# Patient Record
Sex: FEMALE | Race: WHITE | NOT HISPANIC OR LATINO | Employment: OTHER | URBAN - METROPOLITAN AREA
[De-identification: names, ages, dates, MRNs, and addresses within clinical notes are randomized per-mention and may not be internally consistent; named-entity substitution may affect disease eponyms.]

---

## 2022-05-10 ENCOUNTER — EVALUATION (OUTPATIENT)
Dept: PHYSICAL THERAPY | Facility: MEDICAL CENTER | Age: 67
End: 2022-05-10
Payer: MEDICARE

## 2022-05-10 DIAGNOSIS — M76.11 PSOAS TENDINITIS OF RIGHT SIDE: Primary | ICD-10-CM

## 2022-05-10 PROCEDURE — 97110 THERAPEUTIC EXERCISES: CPT | Performed by: PHYSICAL THERAPIST

## 2022-05-10 PROCEDURE — 97161 PT EVAL LOW COMPLEX 20 MIN: CPT | Performed by: PHYSICAL THERAPIST

## 2022-05-10 RX ORDER — GABAPENTIN 300 MG/1
300 CAPSULE ORAL 3 TIMES DAILY
COMMUNITY

## 2022-05-10 RX ORDER — FLUTICASONE PROPIONATE 50 MCG
1 SPRAY, SUSPENSION (ML) NASAL DAILY
COMMUNITY

## 2022-05-10 NOTE — LETTER
May 12, 2022    Irvin Escobedo MD  [place]    Patient: Cesar Melendez   YOB: 1955   Date of Visit: 5/10/2022     Encounter Diagnosis     ICD-10-CM    1  Psoas tendinitis of right side  M76 11        Dear Dr Myriam Hamman:    Thank you for your recent referral of Cesar Melendez  Please review the attached evaluation summary from Marian's recent visit  Please verify that you agree with the plan of care by signing the attached order  If you have any questions or concerns, please do not hesitate to call  I sincerely appreciate the opportunity to share in the care of one of your patients and hope to have another opportunity to work with you in the near future  Sincerely,    Ismael Rodriguez, PT      Referring Provider:      I certify that I have read the below Plan of Care and certify the need for these services furnished under this plan of treatment while under my care  Irvin Escobedo MD  [address] [place] [address]  Via Fax: [phone]          PT Evaluation     Today's date: 5/10/2022  Patient name: Cesar Melendez  : 1955  MRN: [id number]  Referring provider: Devendra Fuentes MD  Dx:   Encounter Diagnosis   Name Primary?  Psoas tendinitis of right side Yes                  Assessment  Assessment details: Cesar Melendez is a 72 y/o female who presents with complaints of chronic R hip pain  The patient's greatest concerns are not being able to perform daily activities and workout routines without pain  Primary movement impairment diagnosis of R iliopsoas tissue extensibility deficits and hip accessory weakness on the right side, resulting in pathoanatomical symptoms of R psoas tendinitis, which limits her ability to perform functional activities without pain    Pt  will benefit from skilled PT services that includes manual therapy techniques to enhance tissue extensibility, neuromuscular re-education to facilitate motor control, therapeutic exercise to increase functional mobility, and modalities prn to reduce pain and inflammation  Impairments: abnormal muscle firing, activity intolerance, impaired physical strength, lacks appropriate home exercise program, pain with function and poor body mechanics  Understanding of Dx/Px/POC: good   Prognosis: good    Goals  Impairment Goals  - Pt I with initial HEP in 1-2 visits  - Improve ROM equal to contralateral side in 4-6 weeks  - Increase strength to 5/5 in all affected areas in 4-6 weeks    Functional Goals  - Increase Functional Status Measure to: 77 in 6-8 weeks  - Patient will be independent with comprehensive HEP in 6-8 weeks  - Ambulation is improved to prior level of function in 6-8 weeks  - Stair climbing and hiking is improved to prior level of function in 6-8 weeks  - Squatting is improved to prior level of function in 6-8 weeks    Plan  Patient would benefit from: PT eval  Planned modality interventions: thermotherapy: hydrocollator packs  Other planned modality interventions: EPAT  Planned therapy interventions: manual therapy, neuromuscular re-education, patient education, strengthening, stretching, therapeutic exercise, home exercise program, graded exercise, graded activity, flexibility, balance/weight bearing training, abdominal trunk stabilization, balance and body mechanics training  Other planned therapy interventions: EPAT  Frequency: 2x week  Duration in weeks: 8  Treatment plan discussed with: patient      Subjective Evaluation    History of Present Illness  Mechanism of injury: Patient presents c/ chronic R hip pain that has been bothersome for at least the last 3 months  Patient works out daily doing various workout routines  Patient notes that hiking and squatting is painful  Patient continues c/ mild numbness since SEAMUS on 12/29/2020 performed by Dr Marie Sawant  She was prescribed Meloxicam and had a psoas CSI on 3/28/2022    Patient would like to be able to do all activities without pain  Pain  Current pain ratin  At best pain ratin  At worst pain ratin  Location: R anterior hip  Quality: radiating, sharp and tight      Diagnostic Tests  X-ray: normal (2/3/2022)  Treatments  Previous treatment: injection treatment  Current treatment: physical therapy  Patient Goals  Patient goals for therapy: decreased pain, increased strength, independence with ADLs/IADLs and return to sport/leisure activities        Objective     Observations     Additional Observation Details  (-) R LE edema    +2 DP/PT pulses bilaterally  Palpation     Right   Hypertonic in the iliopsoas  Tenderness of the iliopsoas and TFL  Tenderness     Right Hip   No tenderness in the ASIS and PSIS  Lumbar Screen  Lumbar range of motion within normal limits      Neurological Testing     Sensation     Hip   Left Hip   Intact: light touch    Right Hip   Intact: light touch    Reflexes   Left   Patellar (L4): normal (2+)  Achilles (S1): normal (2+)    Right   Patellar (L4): normal (2+)  Achilles (S1): normal (2+)    Active Range of Motion   Left Shoulder   External rotation 0°: 35 degrees   Internal rotation 0°: 30 degrees     Right Shoulder   External rotation 0°: 30 degrees   Internal rotation 0°: 30 degrees   Left Hip   Flexion: WFL  Extension: WFL  Abduction: WFL  Adduction: WFL    Right Hip   Flexion: with pain  Extension: WFL  Abduction: WFL  Adduction: WFL    Passive Range of Motion   Left Hip   Normal passive range of motion  External rotation (90/90): 35 degrees   Internal rotation (90/90): 30 degrees     Right Hip   Normal passive range of motion  External rotation (90/90): 30 degrees   Internal rotation (90/90): 30 degrees     Strength/Myotome Testing     Left Hip   Planes of Motion   Flexion: 5  Extension: 5  Abduction: 5  Adduction: 5    Isolated Muscles   Gluteus ryder: 5  Gluteus medius: 5  TFL: 5  Iliopsoas: 5    Right Hip   Planes of Motion   Flexion: 3-  Extension: 3+  Abduction: 3+  Adduction: 3+    Isolated Muscles   Gluteus maximums: 3+  Gluteus medius: 3+  TFL: 4  Iliopsoas: 3+ (pain)    Tests     Right Hip   Positive Ely's  Negative long sit  Shai: Positive  90/90 SLR: Negative  Functional Assessment      Squat    Sitting toward left side  Single Leg Squat   Left Leg  Within functional limits  Right Leg  Pain and valgus  Comments  Anterior pelvic tilt c/ squatting    Posterior weight shift: moderate    Depth of femur height: above 90 degrees       Precautions:  Hx R SEAMUS;  Hx Breast CA; Osteoporosis    Manuals 5/10            Iliopsoas release AZ                                                   Neuro Re-Ed             TA isometrics 20x5s                                      Ther Ex             Prone glute squeezes 30x5s            Prone heel pushes 30x5s            Prone glute sets KF 20x5s                                                                             Ther Activity                                       Gait Training                                       Modalities             EPAT                                 Etienne Terrell, PT  5/11/2022,6:30 PM

## 2022-05-10 NOTE — PROGRESS NOTES
PT Evaluation     Today's date: 5/10/2022  Patient name: Mario Leon  : 1955  MRN: [id number]  Referring provider: Bipin Suresh MD  Dx:   Encounter Diagnosis   Name Primary?  Psoas tendinitis of right side Yes                  Assessment  Assessment details: Mario Leon is a 74 y/o female who presents with complaints of chronic R hip pain  The patient's greatest concerns are not being able to perform daily activities and workout routines without pain  Primary movement impairment diagnosis of R iliopsoas tissue extensibility deficits and hip accessory weakness on the right side, resulting in pathoanatomical symptoms of R psoas tendinitis, which limits her ability to perform functional activities without pain  Pt  will benefit from skilled PT services that includes manual therapy techniques to enhance tissue extensibility, neuromuscular re-education to facilitate motor control, therapeutic exercise to increase functional mobility, and modalities prn to reduce pain and inflammation    Impairments: abnormal muscle firing, activity intolerance, impaired physical strength, lacks appropriate home exercise program, pain with function and poor body mechanics  Understanding of Dx/Px/POC: good   Prognosis: good    Goals  Impairment Goals  - Pt I with initial HEP in 1-2 visits  - Improve ROM equal to contralateral side in 4-6 weeks  - Increase strength to 5/5 in all affected areas in 4-6 weeks    Functional Goals  - Increase Functional Status Measure to: 77 in 6-8 weeks  - Patient will be independent with comprehensive HEP in 6-8 weeks  - Ambulation is improved to prior level of function in 6-8 weeks  - Stair climbing and hiking is improved to prior level of function in 6-8 weeks  - Squatting is improved to prior level of function in 6-8 weeks    Plan  Patient would benefit from: PT eval  Planned modality interventions: thermotherapy: hydrocollator packs  Other planned modality interventions: EPAT  Planned therapy interventions: manual therapy, neuromuscular re-education, patient education, strengthening, stretching, therapeutic exercise, home exercise program, graded exercise, graded activity, flexibility, balance/weight bearing training, abdominal trunk stabilization, balance and body mechanics training  Other planned therapy interventions: EPAT  Frequency: 2x week  Duration in weeks: 8  Treatment plan discussed with: patient      Subjective Evaluation    History of Present Illness  Mechanism of injury: Patient presents c/ chronic R hip pain that has been bothersome for at least the last 3 months  Patient works out daily doing various workout routines  Patient notes that hiking and squatting is painful  Patient continues c/ mild numbness since SEAMUS on 2020 performed by Dr Marie Sawant  She was prescribed Meloxicam and had a psoas CSI on 3/28/2022  Patient would like to be able to do all activities without pain  Pain  Current pain ratin  At best pain ratin  At worst pain ratin  Location: R anterior hip  Quality: radiating, sharp and tight      Diagnostic Tests  X-ray: normal (2/3/2022)  Treatments  Previous treatment: injection treatment  Current treatment: physical therapy  Patient Goals  Patient goals for therapy: decreased pain, increased strength, independence with ADLs/IADLs and return to sport/leisure activities        Objective     Observations     Additional Observation Details  (-) R LE edema    +2 DP/PT pulses bilaterally  Palpation     Right   Hypertonic in the iliopsoas  Tenderness of the iliopsoas and TFL  Tenderness     Right Hip   No tenderness in the ASIS and PSIS  Lumbar Screen  Lumbar range of motion within normal limits      Neurological Testing     Sensation     Hip   Left Hip   Intact: light touch    Right Hip   Intact: light touch    Reflexes   Left   Patellar (L4): normal (2+)  Achilles (S1): normal (2+)    Right   Patellar (L4): normal (2+)  Achilles (S1): normal (2+)    Active Range of Motion   Left Shoulder   External rotation 0°: 35 degrees   Internal rotation 0°: 30 degrees     Right Shoulder   External rotation 0°: 30 degrees   Internal rotation 0°: 30 degrees   Left Hip   Flexion: WFL  Extension: WFL  Abduction: WFL  Adduction: WFL    Right Hip   Flexion: with pain  Extension: WFL  Abduction: WFL  Adduction: WFL    Passive Range of Motion   Left Hip   Normal passive range of motion  External rotation (90/90): 35 degrees   Internal rotation (90/90): 30 degrees     Right Hip   Normal passive range of motion  External rotation (90/90): 30 degrees   Internal rotation (90/90): 30 degrees     Strength/Myotome Testing     Left Hip   Planes of Motion   Flexion: 5  Extension: 5  Abduction: 5  Adduction: 5    Isolated Muscles   Gluteus ryder: 5  Gluteus medius: 5  TFL: 5  Iliopsoas: 5    Right Hip   Planes of Motion   Flexion: 3-  Extension: 3+  Abduction: 3+  Adduction: 3+    Isolated Muscles   Gluteus maximums: 3+  Gluteus medius: 3+  TFL: 4  Iliopsoas: 3+ (pain)    Tests     Right Hip   Positive Ely's  Negative long sit  Shai: Positive  90/90 SLR: Negative  Functional Assessment      Squat    Sitting toward left side  Single Leg Squat   Left Leg  Within functional limits  Right Leg  Pain and valgus  Comments  Anterior pelvic tilt c/ squatting    Posterior weight shift: moderate    Depth of femur height: above 90 degrees       Precautions:  Hx R SEAMUS;  Hx Breast CA; Osteoporosis    Manuals 5/10            Iliopsoas release AZ                                                   Neuro Re-Ed             TA isometrics 20x5s                                      Ther Ex             Prone glute squeezes 30x5s            Prone heel pushes 30x5s            Prone glute sets KF 20x5s                                                                             Ther Activity                                       Gait Training Richard Sinha, PT  5/11/2022,6:30 PM

## 2022-05-17 ENCOUNTER — OFFICE VISIT (OUTPATIENT)
Dept: PHYSICAL THERAPY | Facility: MEDICAL CENTER | Age: 67
End: 2022-05-17
Payer: MEDICARE

## 2022-05-17 DIAGNOSIS — M76.11 PSOAS TENDINITIS OF RIGHT SIDE: Primary | ICD-10-CM

## 2022-05-17 PROCEDURE — 97140 MANUAL THERAPY 1/> REGIONS: CPT | Performed by: PHYSICAL THERAPIST

## 2022-05-17 PROCEDURE — 97110 THERAPEUTIC EXERCISES: CPT | Performed by: PHYSICAL THERAPIST

## 2022-05-20 ENCOUNTER — OFFICE VISIT (OUTPATIENT)
Dept: PHYSICAL THERAPY | Facility: MEDICAL CENTER | Age: 67
End: 2022-05-20
Payer: MEDICARE

## 2022-05-20 DIAGNOSIS — M76.11 PSOAS TENDINITIS OF RIGHT SIDE: Primary | ICD-10-CM

## 2022-05-20 PROCEDURE — 97110 THERAPEUTIC EXERCISES: CPT | Performed by: PHYSICAL THERAPIST

## 2022-05-20 PROCEDURE — 97140 MANUAL THERAPY 1/> REGIONS: CPT | Performed by: PHYSICAL THERAPIST

## 2022-05-24 ENCOUNTER — APPOINTMENT (OUTPATIENT)
Dept: PHYSICAL THERAPY | Facility: MEDICAL CENTER | Age: 67
End: 2022-05-24
Payer: MEDICARE

## 2022-05-26 ENCOUNTER — OFFICE VISIT (OUTPATIENT)
Dept: PHYSICAL THERAPY | Facility: MEDICAL CENTER | Age: 67
End: 2022-05-26
Payer: MEDICARE

## 2022-05-26 DIAGNOSIS — M76.11 PSOAS TENDINITIS OF RIGHT SIDE: Primary | ICD-10-CM

## 2022-05-26 PROCEDURE — 97140 MANUAL THERAPY 1/> REGIONS: CPT | Performed by: PHYSICAL THERAPIST

## 2022-05-26 PROCEDURE — 97110 THERAPEUTIC EXERCISES: CPT | Performed by: PHYSICAL THERAPIST

## 2022-05-26 NOTE — PROGRESS NOTES
Daily Note     Today's date: 2022  Patient name: Mario Leon  : 1955  MRN: [id number]  Referring provider: Bipin Suresh MD  Dx:   Encounter Diagnosis     ICD-10-CM    1  Psoas tendinitis of right side  M76 11                   Subjective:  Patient states that she is feeling better  Objective: See treatment diary below  Assessment:  Patient demonstrates good tolerance to EPAT  Tolerated treatment well  Patient would benefit from continued PT  Plan: Continue per plan of care  Precautions:  Hx R SEAMUS;  Hx Breast CA; Osteoporosis    Manuals 5/10 5/17 5/20 5/26         Iliopsoas release AZ AZ AZ AZ         STM/MFR  AZ AZ                                    Neuro Re-Ed             TA isometrics 20x5s 30x5s 30x5s 30x5s                                   Ther Ex             Prone glute squeezes 30x5s 30x5s 30x5s 30x5s         Prone heel pushes 30x5s 30x5s 30x5s 30x5s         Prone glute sets KF 20x5s 20x5s 20x5s 30x5s         Hip add  20x5s 30x5s 30x5s         Bridges  2x10 c/ ball 3x10 c/ ball 3x10 c/ ball         Clams  20x5s 30x5s 30x5s         Reverse clams  20x5s 30x5s 30x5s 2#                      SL hip abd  20x5s 20x5s 2x10 5s 2#         1/2 kneel hip mobility 2 way  10x ea 10x ea                       Ther Activity                                       Gait Training                                       Modalities             EPAT (iliopsoas insertion)   AZ AZ

## 2022-05-27 ENCOUNTER — APPOINTMENT (OUTPATIENT)
Dept: PHYSICAL THERAPY | Facility: MEDICAL CENTER | Age: 67
End: 2022-05-27
Payer: MEDICARE

## 2022-05-31 ENCOUNTER — OFFICE VISIT (OUTPATIENT)
Dept: PHYSICAL THERAPY | Facility: MEDICAL CENTER | Age: 67
End: 2022-05-31
Payer: MEDICARE

## 2022-05-31 DIAGNOSIS — M76.11 PSOAS TENDINITIS OF RIGHT SIDE: Primary | ICD-10-CM

## 2022-05-31 PROCEDURE — 97110 THERAPEUTIC EXERCISES: CPT | Performed by: PHYSICAL THERAPIST

## 2022-05-31 PROCEDURE — 97140 MANUAL THERAPY 1/> REGIONS: CPT | Performed by: PHYSICAL THERAPIST

## 2022-05-31 NOTE — PROGRESS NOTES
Daily Note     Today's date: 2022  Patient name: Aga Larsen  : 1955  MRN: [id number]  Referring provider: Biju Pettit MD  Dx:   Encounter Diagnosis     ICD-10-CM    1  Psoas tendinitis of right side  M76 11                   Subjective:  Patient states that she is doing well  Objective: See treatment diary below  Assessment:  Patient demonstrates good tolerance to EPAT and exercises  Tolerated treatment well  Patient would benefit from continued PT  Plan: Continue per plan of care  Precautions:  Hx R SEAMUS;  Hx Breast CA; Osteoporosis    Manuals 5/10 5/17 5/20 5/26 5/31        Iliopsoas release AZ AZ AZ AZ AZ        STM/MFR  AZ AZ                                    Neuro Re-Ed             TA isometrics 20x5s 30x5s 30x5s 30x5s 30x5s                                  Ther Ex             Prone glute squeezes 30x5s 30x5s 30x5s 30x5s 30x5s        Prone heel pushes 30x5s 30x5s 30x5s 30x5s 30x5s        Prone glute sets KF 20x5s 20x5s 20x5s 30x5s 30x5s        Hip add  20x5s 30x5s 30x5s 30x5s        Bridges  2x10 c/ ball 3x10 c/ ball 3x10 c/ ball 3x10 c/ ball        Clams  20x5s 30x5s 30x5s 30x5s        Reverse clams  20x5s 30x5s 30x5s 2# 30x5s2#                     SL hip abd  20x5s 20x5s 2x10 5s 2# 3x10 5s 2#        1/2 kneel hip mobility 2 way  10x ea 10x ea                       Ther Activity                                       Gait Training                                       Modalities             EPAT (iliopsoas insertion)   AZ AZ AZ

## 2022-06-03 ENCOUNTER — OFFICE VISIT (OUTPATIENT)
Dept: PHYSICAL THERAPY | Facility: MEDICAL CENTER | Age: 67
End: 2022-06-03
Payer: MEDICARE

## 2022-06-03 DIAGNOSIS — M76.11 PSOAS TENDINITIS OF RIGHT SIDE: Primary | ICD-10-CM

## 2022-06-03 PROCEDURE — 97140 MANUAL THERAPY 1/> REGIONS: CPT | Performed by: PHYSICAL THERAPIST

## 2022-06-03 PROCEDURE — 97110 THERAPEUTIC EXERCISES: CPT | Performed by: PHYSICAL THERAPIST

## 2022-06-03 NOTE — PROGRESS NOTES
Daily Note     Today's date: 6/3/2022  Patient name: Mario Leon  : 1955  MRN: [id number]  Referring provider: Bipin Suresh MD  Dx:   Encounter Diagnosis     ICD-10-CM    1  Psoas tendinitis of right side  M76 11                   Subjective:  Patient states that she is doing well  Objective: See treatment diary below  Assessment:  Patient demonstrates good progress c/ less anterior hip pain and increased ability to perform hip flexion  She continues c/ tenderness at the iliopsoas insertion  Patient had pain c/ alternating marches into PB ball  Tolerated treatment well  Patient would benefit from continued PT  Plan: Continue per plan of care  Precautions:  Hx R SEAMUS;  Hx Breast CA; Osteoporosis    Manuals 5/10 5/17 5/20 5/26 5/31 6/3       Iliopsoas release AZ AZ AZ AZ AZ AZ       STM/MFR  AZ AZ   AZ                                 Neuro Re-Ed             TA isometrics 20x5s 30x5s 30x5s 30x5s 30x5s 30x5s                                 Ther Ex             Prone glute squeezes 30x5s 30x5s 30x5s 30x5s 30x5s 30x5s       Prone heel pushes 30x5s 30x5s 30x5s 30x5s 30x5s 30x5s       Prone glute sets KF 20x5s 20x5s 20x5s 30x5s 30x5s 20x5s 2#       Hip add  20x5s 30x5s 30x5s 30x5s 30x5s       Bridges  2x10 c/ ball 3x10 c/ ball 3x10 c/ ball 3x10 c/ ball 3x10 c/ ball       Clams  20x5s 30x5s 30x5s 30x5s 30x5s       Reverse clams  20x5s 30x5s 30x5s 2# 30x5s 2# 3x15 5s 2#                    SL hip abd  20x5s 20x5s 2x10 5s 2# 3x10 5s 2# 3x15 5s 2#       1/2 kneel hip mobility 2 way  10x ea 10x ea   10x ea                    Ther Activity                                       Gait Training                                       Modalities             EPAT (iliopsoas insertion)   AZ AZ AZ AZ       MH      10

## 2022-06-06 ENCOUNTER — OFFICE VISIT (OUTPATIENT)
Dept: PHYSICAL THERAPY | Facility: MEDICAL CENTER | Age: 67
End: 2022-06-06
Payer: MEDICARE

## 2022-06-06 DIAGNOSIS — M76.11 PSOAS TENDINITIS OF RIGHT SIDE: Primary | ICD-10-CM

## 2022-06-06 PROCEDURE — 97140 MANUAL THERAPY 1/> REGIONS: CPT | Performed by: PHYSICAL THERAPIST

## 2022-06-06 PROCEDURE — 97110 THERAPEUTIC EXERCISES: CPT | Performed by: PHYSICAL THERAPIST

## 2022-06-06 NOTE — PROGRESS NOTES
Daily Note     Today's date: 2022  Patient name: Jose Maria Vee  : 1955  MRN: [id number]  Referring provider: Bibi Goncalves MD  Dx:   Encounter Diagnosis     ICD-10-CM    1  Psoas tendinitis of right side  M76 11                   Subjective:  Patient states that she was unable to do her exercises this weekend d/t soreness t/o her R hip flexor and adductor region  Objective: See treatment diary below  Assessment:  Patient continues to have pain c/ active hip flexion  She demonstrates iliopsoas tightness and adductor tightness  EPAT performed today, which patient tolerates well  Patient education on foam rolling to add to HEP  Tolerated treatment well  Patient would benefit from continued PT    Plan: Continue per plan of care  Precautions:  Hx R SEAMUS;  Hx Breast CA; Osteoporosis    Manuals 5/10 5/17 5/20 5/26 5/31 6/3 6/6      Iliopsoas release AZ AZ AZ AZ AZ AZ       STM/MFR  AZ AZ   AZ AZ                                Neuro Re-Ed             TA isometrics 20x5s 30x5s 30x5s 30x5s 30x5s 30x5s                                 Ther Ex       HEP Review 10'      Prone glute squeezes 30x5s 30x5s 30x5s 30x5s 30x5s 30x5s       Prone heel pushes 30x5s 30x5s 30x5s 30x5s 30x5s 30x5s       Prone glute sets KF 20x5s 20x5s 20x5s 30x5s 30x5s 20x5s 2#       Hip add  20x5s 30x5s 30x5s 30x5s 30x5s       Bridges  2x10 c/ ball 3x10 c/ ball 3x10 c/ ball 3x10 c/ ball 3x10 c/ ball       Clams  20x5s 30x5s 30x5s 30x5s 30x5s       Reverse clams  20x5s 30x5s 30x5s 2# 30x5s 2# 3x15 5s 2#                    SL hip abd  20x5s 20x5s 2x10 5s 2# 3x10 5s 2# 3x15 5s 2#       1/2 kneel hip mobility 2 way  10x ea 10x ea   10x ea       Foam rolling       X      Ther Activity                                       Gait Training                                       Modalities             EPAT (iliopsoas insertion)   AZ AZ AZ AZ AZ      MH      10'

## 2022-06-10 ENCOUNTER — OFFICE VISIT (OUTPATIENT)
Dept: PHYSICAL THERAPY | Facility: MEDICAL CENTER | Age: 67
End: 2022-06-10
Payer: MEDICARE

## 2022-06-10 DIAGNOSIS — M76.11 PSOAS TENDINITIS OF RIGHT SIDE: Primary | ICD-10-CM

## 2022-06-10 PROCEDURE — 97110 THERAPEUTIC EXERCISES: CPT | Performed by: PHYSICAL THERAPIST

## 2022-06-10 PROCEDURE — 97112 NEUROMUSCULAR REEDUCATION: CPT | Performed by: PHYSICAL THERAPIST

## 2022-06-10 PROCEDURE — 97140 MANUAL THERAPY 1/> REGIONS: CPT | Performed by: PHYSICAL THERAPIST

## 2022-06-10 NOTE — PROGRESS NOTES
Daily Note     Today's date: 6/10/2022  Patient name: Jason Pena  : 1955  MRN: [id number]  Referring provider: Verenice Bonilla MD  Dx:   Encounter Diagnosis     ICD-10-CM    1  Psoas tendinitis of right side  M76 11                   Subjective:  Patient states that she feels good  Objective: See treatment diary below  Assessment:  Patient presents c/ less pain t/o R hip flexor  Patient education on neutral spine positioning and pelvic NMRE  Tolerated treatment well  Patient would benefit from continued PT    Plan: Continue per plan of care  Precautions:  Hx R SEAMUS;  Hx Breast CA; Osteoporosis    Manuals 5/10 5/17 5/20 5/26 5/31 6/3 6/6 6/10     Iliopsoas release AZ AZ AZ AZ AZ AZ  AZ     STM/MFR  AZ AZ   AZ AZ AZ                               Neuro Re-Ed             TA isometrics 20x5s 30x5s 30x5s 30x5s 30x5s 30x5s  30x5s     Marches        10x                  Ther Ex       HEP Review 10'      SL sit ups        5x     Prone glute squeezes 30x5s 30x5s 30x5s 30x5s 30x5s 30x5s       Prone heel pushes 30x5s 30x5s 30x5s 30x5s 30x5s 30x5s       Prone glute sets KF 20x5s 20x5s 20x5s 30x5s 30x5s 20x5s 2#       Hip add  20x5s 30x5s 30x5s 30x5s 30x5s  20x5s     Bridges  2x10 c/ ball 3x10 c/ ball 3x10 c/ ball 3x10 c/ ball 3x10 c/ ball  20x5s c/ ball 20x 5s green     Clams  20x5s 30x5s 30x5s 30x5s 30x5s  [id number]     Reverse clams  20x5s 30x5s 30x5s 2# 30x5s 2# 3x15 5s 2#       PB ecc ham curls        10x b/l     SL hip abd  20x5s 20x5s 2x10 5s 2# 3x10 5s 2# 3x15 5s 2#       1/2 kneel hip mobility 2 way  10x ea 10x ea   10x ea       Foam rolling       X      Ther Activity                                       Gait Training                                       Modalities             EPAT (iliopsoas insertion)   AZ AZ AZ AZ AZ      MH      10'

## 2022-06-13 ENCOUNTER — APPOINTMENT (OUTPATIENT)
Dept: PHYSICAL THERAPY | Facility: MEDICAL CENTER | Age: 67
End: 2022-06-13
Payer: MEDICARE

## 2022-06-17 ENCOUNTER — OFFICE VISIT (OUTPATIENT)
Dept: PHYSICAL THERAPY | Facility: MEDICAL CENTER | Age: 67
End: 2022-06-17
Payer: MEDICARE

## 2022-06-17 DIAGNOSIS — M76.11 PSOAS TENDINITIS OF RIGHT SIDE: Primary | ICD-10-CM

## 2022-06-17 PROCEDURE — 97112 NEUROMUSCULAR REEDUCATION: CPT | Performed by: PHYSICAL THERAPIST

## 2022-06-17 NOTE — PROGRESS NOTES
Progress Note     Today's date: 2022  Patient name: Micaela Olivera  : 1955  MRN: [id number]  Referring provider: Edna Peterson MD  Dx:   Encounter Diagnosis     ICD-10-CM    1  Psoas tendinitis of right side  M76 11                   Subjective:  Patient states that she was doing really well until hiking 10 miles this week, which irritated the right hip and sent her back to baseline where she was last week  Objective: See treatment diary below  Assessment:  Patient demonstrates good tolerance to EPAT  Performed on iliopsoas insertion, as well as rectus femoris today  Patient demonstrates positive progress, but has setbacks c/ increased activity that involves hip flexion  She has been instructed to stay away from activities that aggravate her pain and to continue c/ HEP  Tolerated treatment well  Patient would benefit from continued PT  Goals  Impairment Goals  - Pt I with initial HEP in 1-2 visits- achieved  - Improve ROM equal to contralateral side in 4-6 weeks- achieved  - Increase strength to 5/5 in all affected areas in 4-6 weeks- in progress    Functional Goals  - Increase Functional Status Measure to: 77 in 6-8 weeks- in progress  - Patient will be independent with comprehensive HEP in 6-8 weeks- in progress  - Ambulation is improved to prior level of function in 6-8 weeks- achieved  - Stair climbing and hiking is improved to prior level of function in 6-8 weeks- in progress  - Squatting is improved to prior level of function in 6-8 weeks- in progress    Plan: Continue per plan of care  Precautions:  Hx R SEAMUS;  Hx Breast CA; Osteoporosis    Manuals 5/10 5/17 5/20 5/26 5/31 6/3 6/6 6/10 6/17    Iliopsoas release AZ AZ AZ AZ AZ AZ  AZ     STM/MFR  AZ AZ   AZ AZ AZ                               Neuro Re-Ed             TA isometrics 20x5s 30x5s 30x5s 30x5s 30x5s 30x5s  30x5s 30x5s    Marches        10x 2x10                 Ther Ex       HEP Review 10'      SL sit ups        5x Prone glute squeezes 30x5s 30x5s 30x5s 30x5s 30x5s 30x5s       Prone heel pushes 30x5s 30x5s 30x5s 30x5s 30x5s 30x5s       Prone glute sets KF 20x5s 20x5s 20x5s 30x5s 30x5s 20x5s 2#       Hip add  20x5s 30x5s 30x5s 30x5s 30x5s  20x5s 30x5s    Bridges  2x10 c/ ball 3x10 c/ ball 3x10 c/ ball 3x10 c/ ball 3x10 c/ ball  20x5s c/ ball 20x 5s green 20x5s c/ ball 20x5 green    Clams  20x5s 30x5s 30x5s 30x5s 30x5s  [id number]    Reverse clams  20x5s 30x5s 30x5s 2# 30x5s 2# 3x15 5s 2#       PB ecc ham curls        10x b/l     SL hip abd  20x5s 20x5s 2x10 5s 2# 3x10 5s 2# 3x15 5s 2#       1/2 kneel hip mobility 2 way  10x ea 10x ea   10x ea       Foam rolling       X      Ther Activity                                       Gait Training                                       Modalities             EPAT (iliopsoas insertion)   AZ AZ AZ AZ AZ  AZ          10'

## 2022-06-20 ENCOUNTER — OFFICE VISIT (OUTPATIENT)
Dept: PHYSICAL THERAPY | Facility: MEDICAL CENTER | Age: 67
End: 2022-06-20
Payer: MEDICARE

## 2022-06-20 DIAGNOSIS — M76.11 PSOAS TENDINITIS OF RIGHT SIDE: Primary | ICD-10-CM

## 2022-06-20 PROCEDURE — 97110 THERAPEUTIC EXERCISES: CPT | Performed by: PHYSICAL THERAPIST

## 2022-06-20 PROCEDURE — 97112 NEUROMUSCULAR REEDUCATION: CPT | Performed by: PHYSICAL THERAPIST

## 2022-06-20 NOTE — PROGRESS NOTES
Daily Note     Today's date: 2022  Patient name: Stiven Duran  : 1955  MRN: [id number]  Referring provider: Joelle Lagunas MD  Dx:   Encounter Diagnosis     ICD-10-CM    1  Psoas tendinitis of right side  M76 11                   Subjective:  Patient states that she feels good  Objective: See treatment diary below  Assessment:  Patient demonstrates good tolerance to EPAT  Tolerated treatment well  Patient would benefit from continued PT  Plan: Continue per plan of care  Precautions:  Hx R SEAMUS;  Hx Breast CA; Osteoporosis    Manuals 5/10 5/17 5/20 5/26 5/31 6/3 6/6 6/10 6/17 6/20   Iliopsoas release AZ AZ AZ AZ AZ AZ  AZ     STM/MFR  AZ AZ   AZ AZ AZ                               Neuro Re-Ed             TA isometrics 20x5s 30x5s 30x5s 30x5s 30x5s 30x5s  30x5s 30x5s 30x5s   Marches        10x 2x10 3x10                Ther Ex       HEP Review 10'      SL sit ups        5x     Prone glute squeezes 30x5s 30x5s 30x5s 30x5s 30x5s 30x5s       Prone heel pushes 30x5s 30x5s 30x5s 30x5s 30x5s 30x5s       Prone glute sets KF 20x5s 20x5s 20x5s 30x5s 30x5s 20x5s 2#       Hip add  20x5s 30x5s 30x5s 30x5s 30x5s  20x5s 30x5s 30x5s   Bridges  2x10 c/ ball 3x10 c/ ball 3x10 c/ ball 3x10 c/ ball 3x10 c/ ball  20x5s c/ ball 20x 5s green 20x5s c/ ball 20x5 green 20x5s c/ ball 20x5s green   Clams  20x5s 30x5s 30x5s 30x5s 30x5s  [id number] 30x5s green   Reverse clams  20x5s 30x5s 30x5s 2# 30x5s 2# 3x15 5s 2#       PB ecc ham curls        10x b/l  10x   b/l   SL hip abd  20x5s 20x5s 2x10 5s 2# 3x10 5s 2# 3x15 5s 2#       1/2 kneel hip mobility 2 way  10x ea 10x ea   10x ea       Foam rolling       X      Ther Activity                                       Gait Training                                       Modalities             EPAT (iliopsoas insertion)   AZ AZ AZ AZ AZ  AZ AZ   MH      10'

## 2022-06-24 ENCOUNTER — OFFICE VISIT (OUTPATIENT)
Dept: PHYSICAL THERAPY | Facility: MEDICAL CENTER | Age: 67
End: 2022-06-24
Payer: MEDICARE

## 2022-06-24 DIAGNOSIS — M76.11 PSOAS TENDINITIS OF RIGHT SIDE: Primary | ICD-10-CM

## 2022-06-24 PROCEDURE — 97140 MANUAL THERAPY 1/> REGIONS: CPT | Performed by: PHYSICAL THERAPIST

## 2022-06-24 PROCEDURE — 97110 THERAPEUTIC EXERCISES: CPT | Performed by: PHYSICAL THERAPIST

## 2022-06-24 NOTE — PROGRESS NOTES
Daily Note     Today's date: 2022  Patient name: Inderjit Ricks  : 1955  MRN: [id number]  Referring provider: Noelle Quesada MD  Dx:   Encounter Diagnosis     ICD-10-CM    1  Psoas tendinitis of right side  M76 11                   Subjective:  Patient states that she is feeling better  Objective: See treatment diary below  Assessment:  Patient demonstrates good tolerance to exercise tolerance and has decreased hip pain  Tolerated treatment well  Patient would benefit from continued PT  Plan: Continue per plan of care  Precautions:  Hx R SEAMUS;  Hx Breast CA; Osteoporosis    Manuals             Iliopsoas release AZ            STM/MFR AZ                                      Neuro Re-Ed             TA isometrics 30x5s            Marches 3x10                         Ther Ex             SL sit ups             Prone glute squeezes 30x5s            Prone heel pushes 30x5s            Prone glute sets KF 20x5s             Hip add 30x5s            Bridges 20x5s c/ ball 30x5s blue            Clams 30x5s blue            Reverse clams 3x10 3#            PB ecc ham curls 10x b/l            SL hip abd 3x10 3#            1/2 kneel hip mobility 2 way 10x 10s ea            Foam rolling 2' 3 way            Ther Activity                                       Gait Training                                       Modalities             EPAT (iliopsoas insertion)              10'

## 2022-07-01 ENCOUNTER — OFFICE VISIT (OUTPATIENT)
Dept: PHYSICAL THERAPY | Facility: MEDICAL CENTER | Age: 67
End: 2022-07-01
Payer: MEDICARE

## 2022-07-01 DIAGNOSIS — M76.11 PSOAS TENDINITIS OF RIGHT SIDE: Primary | ICD-10-CM

## 2022-07-01 PROCEDURE — 97164 PT RE-EVAL EST PLAN CARE: CPT | Performed by: PHYSICAL THERAPIST

## 2022-07-01 PROCEDURE — 97110 THERAPEUTIC EXERCISES: CPT | Performed by: PHYSICAL THERAPIST

## 2022-07-01 NOTE — LETTER
2022    Robert Lacey MD  [place]    Patient: Alyce Colorado   YOB: 1955   Date of Visit: 2022     Encounter Diagnosis     ICD-10-CM    1  Psoas tendinitis of right side  M76 11 CANCELED: PT plan of care cert/re-cert       Dear Dr Vic Amezcua:    Thank you for your recent referral of Alyce Colorado  Please review the attached evaluation summary from Marian's recent visit  Please verify that you agree with the plan of care by signing the attached order  If you have any questions or concerns, please do not hesitate to call  I sincerely appreciate the opportunity to share in the care of one of your patients and hope to have another opportunity to work with you in the near future  Sincerely,    Héctor Acosta, PT      Referring Provider:      I certify that I have read the below Plan of Care and certify the need for these services furnished under this plan of treatment while under my care  Robert Lacey MD  [address] [place] [address]  Via Fax: [phone]          PT Re-Evaluation     Today's date: 5/10/2022  Patient name: Alyce Colorado  : 1955  MRN: [id number]  Referring provider: Rosalee Renee MD  Dx:   Encounter Diagnosis   Name Primary?  Psoas tendinitis of right side Yes                  Assessment  Assessment details: Alyce Colorado is a 72 y/o female who has been compliant c/ attending physical therapy d/t chronic R hip pain  The patient's greatest concerns are not being able to perform daily activities without pain  Primary movement impairment diagnosis of R hip flexor dysfunction, resulting in pathoanatomical symptoms of R psoas tendinitis, which limits her ability to perform functional activities without pain    Pt  will continue benefit from skilled PT services that includes manual therapy techniques to enhance tissue extensibility, neuromuscular re-education to facilitate motor control, therapeutic exercise to increase functional mobility, and modalities prn to reduce pain and inflammation  Impairments: activity intolerance, impaired physical strength and pain with function  Understanding of Dx/Px/POC: good   Prognosis: good    Goals  Impairment Goals  - Pt I with HEP in 4-6 weeks   - Increase strength to 5/5 in all affected areas in 4-6 weeks    Functional Goals  - Increase Functional Status Measure to: 77 in 4-6 weeks  - Patient will be independent with comprehensive HEP in 4-6 weeks  - Stair climbing and hiking is improved to prior level of function in 6 weeks  - Squatting is improved to prior level of function in 6 weeks    Plan  Plan details: Patient has made progress c/ hip pain and demonstrates improvement after 6 weeks of EPAT  She will benefit from 4-6 weeks of strengthening, which has been slower than expected d/t pain to allow for full return to hiking and all functional activities  Planned modality interventions: thermotherapy: hydrocollator packs  Other planned modality interventions: EPAT  Planned therapy interventions: manual therapy, neuromuscular re-education, patient education, strengthening, stretching, therapeutic exercise, home exercise program, graded exercise, graded activity, flexibility, balance/weight bearing training, abdominal trunk stabilization, balance and body mechanics training  Frequency: 1-2x/week  Duration in weeks: 6  Treatment plan discussed with: patient      Subjective Evaluation    History of Present Illness  Mechanism of injury: Patient presents c/ chronic R hip pain that has been bothersome for at least the last 3 months  Patient works out daily doing various workout routines  Patient notes that hiking and squatting is painful  Patient continues c/ mild numbness since SEAMUS on 12/29/2020 performed by Dr Darshan Donnelly  She was prescribed Meloxicam and had a psoas CSI on 3/28/2022    Patient would like to be able to do all activities without pain  Pain  Current pain ratin  At best pain ratin  At worst pain ratin  Location: R anterior hip  Quality: radiating, sharp and tight      Diagnostic Tests  X-ray: normal (2/3/2022)  Treatments  Previous treatment: injection treatment  Current treatment: physical therapy  Patient Goals  Patient goals for therapy: decreased pain, increased strength, independence with ADLs/IADLs and return to sport/leisure activities        Objective     Observations     Additional Observation Details  (-) R LE edema    +2 DP/PT pulses bilaterally  Palpation     Right   Hypertonic in the iliopsoas and rectus abdominus  Tenderness of the iliopsoas and rectus abdominus  Tenderness     Right Hip   No tenderness in the ASIS and PSIS  Lumbar Screen  Lumbar range of motion within normal limits      Neurological Testing     Sensation     Hip   Left Hip   Intact: light touch    Right Hip   Intact: light touch    Reflexes   Left   Patellar (L4): normal (2+)  Achilles (S1): normal (2+)    Right   Patellar (L4): normal (2+)  Achilles (S1): normal (2+)    Active Range of Motion   Left Shoulder   External rotation 0°: 35 degrees   Internal rotation 0°: 30 degrees     Right Shoulder   External rotation 0°: 30 degrees   Internal rotation 0°: 30 degrees   Left Hip   Flexion: WFL  Extension: WFL  Abduction: WFL  Adduction: WFL    Right Hip   Flexion: WFL  Extension: WFL  Abduction: WFL  Adduction: WFL    Passive Range of Motion   Left Hip   Normal passive range of motion  External rotation (90/90): 35 degrees   Internal rotation (90/90): 30 degrees     Right Hip   Normal passive range of motion  External rotation (90/90): 35 degrees   Internal rotation (90/90): 30 degrees     Strength/Myotome Testing     Left Hip   Planes of Motion   Flexion: 5  Extension: 5  Abduction: 5  Adduction: 5    Isolated Muscles   Gluteus ryder: 5  Gluteus medius: 5  TFL: 5  Iliopsoas: 5    Right Hip   Planes of Motion Flexion: 3+  Extension: 4-  Abduction: 4  Adduction: 4-    Isolated Muscles   Gluteus maximums: 4-  Gluteus medius: 4  TFL: 5  Iliopsoas: 3+ (pain)    Tests     Right Hip   Positive Ely's  Negative long sit  Shai: Positive  90/90 SLR: Negative  Functional Assessment      Squat    Sitting toward left side  Single Leg Squat   Left Leg  Within functional limits  Right Leg  Pain and valgus  Comments  Anterior pelvic tilt c/ squatting    Posterior weight shift: moderate    Depth of femur height: above 90 degrees       Precautions:  Hx R SEAMUS;  Hx Breast CA; Osteoporosis    Manuals 7/1            Iliopsoas release                                                    Neuro Re-Ed             TA isometrics 30x5s            Marches 2x10                         Ther Ex             Hip flexor str 3x30s            Quad str 3x30s                         Ecc hip flex EOT 3x5                                                                Ther Activity                                       Gait Training                                       Modalities             EPAT LON Godinez, PT  7/1/2022,8:37 AM

## 2022-07-01 NOTE — PROGRESS NOTES
PT Re-Evaluation     Today's date: 5/10/2022  Patient name: Camron Cavazos  : 1955  MRN: [id number]  Referring provider: Pepe Edwards MD  Dx:   Encounter Diagnosis   Name Primary?  Psoas tendinitis of right side Yes                  Assessment  Assessment details: Camron Cavazos is a 72 y/o female who has been compliant c/ attending physical therapy d/t chronic R hip pain  The patient's greatest concerns are not being able to perform daily activities without pain  Primary movement impairment diagnosis of R hip flexor dysfunction, resulting in pathoanatomical symptoms of R psoas tendinitis, which limits her ability to perform functional activities without pain  Pt  will continue benefit from skilled PT services that includes manual therapy techniques to enhance tissue extensibility, neuromuscular re-education to facilitate motor control, therapeutic exercise to increase functional mobility, and modalities prn to reduce pain and inflammation  Impairments: activity intolerance, impaired physical strength and pain with function  Understanding of Dx/Px/POC: good   Prognosis: good    Goals  Impairment Goals  - Pt I with HEP in 4-6 weeks   - Increase strength to 5/5 in all affected areas in 4-6 weeks    Functional Goals  - Increase Functional Status Measure to: 77 in 4-6 weeks  - Patient will be independent with comprehensive HEP in 4-6 weeks  - Stair climbing and hiking is improved to prior level of function in 6 weeks  - Squatting is improved to prior level of function in 6 weeks    Plan  Plan details: Patient has made progress c/ hip pain and demonstrates improvement after 6 weeks of EPAT  She will benefit from 4-6 weeks of strengthening, which has been slower than expected d/t pain to allow for full return to hiking and all functional activities      Planned modality interventions: thermotherapy: hydrocollator packs  Other planned modality interventions: EPAT  Planned therapy interventions: manual therapy, neuromuscular re-education, patient education, strengthening, stretching, therapeutic exercise, home exercise program, graded exercise, graded activity, flexibility, balance/weight bearing training, abdominal trunk stabilization, balance and body mechanics training  Frequency: 1-2x/week  Duration in weeks: 6  Treatment plan discussed with: patient      Subjective Evaluation    History of Present Illness  Mechanism of injury: Patient presents c/ chronic R hip pain that has been bothersome for at least the last 3 months  Patient works out daily doing various workout routines  Patient notes that hiking and squatting is painful  Patient continues c/ mild numbness since SEAMUS on 2020 performed by Dr Keith Turner  She was prescribed Meloxicam and had a psoas CSI on 3/28/2022  Patient would like to be able to do all activities without pain  Pain  Current pain ratin  At best pain ratin  At worst pain ratin  Location: R anterior hip  Quality: radiating, sharp and tight      Diagnostic Tests  X-ray: normal (2/3/2022)  Treatments  Previous treatment: injection treatment  Current treatment: physical therapy  Patient Goals  Patient goals for therapy: decreased pain, increased strength, independence with ADLs/IADLs and return to sport/leisure activities        Objective     Observations     Additional Observation Details  (-) R LE edema    +2 DP/PT pulses bilaterally  Palpation     Right   Hypertonic in the iliopsoas and rectus abdominus  Tenderness of the iliopsoas and rectus abdominus  Tenderness     Right Hip   No tenderness in the ASIS and PSIS  Lumbar Screen  Lumbar range of motion within normal limits      Neurological Testing     Sensation     Hip   Left Hip   Intact: light touch    Right Hip   Intact: light touch    Reflexes   Left   Patellar (L4): normal (2+)  Achilles (S1): normal (2+)    Right   Patellar (L4): normal (2+)  Achilles (S1): normal (2+)    Active Range of Motion Left Shoulder   External rotation 0°: 35 degrees   Internal rotation 0°: 30 degrees     Right Shoulder   External rotation 0°: 30 degrees   Internal rotation 0°: 30 degrees   Left Hip   Flexion: WFL  Extension: WFL  Abduction: WFL  Adduction: WFL    Right Hip   Flexion: WFL  Extension: WFL  Abduction: WFL  Adduction: WFL    Passive Range of Motion   Left Hip   Normal passive range of motion  External rotation (90/90): 35 degrees   Internal rotation (90/90): 30 degrees     Right Hip   Normal passive range of motion  External rotation (90/90): 35 degrees   Internal rotation (90/90): 30 degrees     Strength/Myotome Testing     Left Hip   Planes of Motion   Flexion: 5  Extension: 5  Abduction: 5  Adduction: 5    Isolated Muscles   Gluteus ryder: 5  Gluteus medius: 5  TFL: 5  Iliopsoas: 5    Right Hip   Planes of Motion   Flexion: 3+  Extension: 4-  Abduction: 4  Adduction: 4-    Isolated Muscles   Gluteus maximums: 4-  Gluteus medius: 4  TFL: 5  Iliopsoas: 3+ (pain)    Tests     Right Hip   Positive Ely's  Negative long sit  Shai: Positive  90/90 SLR: Negative  Functional Assessment      Squat    Sitting toward left side  Single Leg Squat   Left Leg  Within functional limits  Right Leg  Pain and valgus  Comments  Anterior pelvic tilt c/ squatting    Posterior weight shift: moderate    Depth of femur height: above 90 degrees       Precautions:  Hx R SEAMUS;  Hx Breast CA; Osteoporosis    Manuals 7/1            Iliopsoas release                                                    Neuro Re-Ed             TA isometrics 30x5s            Marches 2x10                         Ther Ex             Hip flexor str 3x30s            Quad str 3x30s                         Ecc hip flex EOT 3x5                                                                Ther Activity                                       Gait Training                                       Modalities             EPAT AZ Ramy Gooden, PT  7/1/2022,8:37 AM

## 2022-07-12 ENCOUNTER — OFFICE VISIT (OUTPATIENT)
Dept: PHYSICAL THERAPY | Facility: MEDICAL CENTER | Age: 67
End: 2022-07-12
Payer: MEDICARE

## 2022-07-12 DIAGNOSIS — M76.11 PSOAS TENDINITIS OF RIGHT SIDE: Primary | ICD-10-CM

## 2022-07-12 PROCEDURE — 97112 NEUROMUSCULAR REEDUCATION: CPT | Performed by: PHYSICAL THERAPIST

## 2022-07-12 PROCEDURE — 97110 THERAPEUTIC EXERCISES: CPT | Performed by: PHYSICAL THERAPIST

## 2022-07-12 NOTE — PROGRESS NOTES
Daily Note     Today's date: 2022  Patient name: Fabio Egan  : 1955  MRN: [id number]  Referring provider: oMrteza Gross MD  Dx:   Encounter Diagnosis     ICD-10-CM    1  Psoas tendinitis of right side  M76 11                   Subjective:  Patient states that she is doing better  Objective: See treatment diary below  Assessment:  Patient presents c/ less pain overall and ability to actively flex her hip to 90 degrees standing  She continues c/ pelvic instability and glute weakness on the right  We will spend 4-6 week specifically working on strength and pelvic stability  Patient educated in HEP to be performed 3x/week  Tolerated treatment well  Patient would benefit from continued PT  Plan: Continue per plan of care  Precautions:  Hx R SEAMUS;  Hx Breast CA; Osteoporosis    Manuals            Iliopsoas release                                                    Neuro Re-Ed             TA isometrics 30x5s 30x5s           Marches 2x10 3x10           90/90 march  3x6 yellow           Ther Ex             Hip flexor str 3x30s 3x30s           Quad str 3x30s 3x30s           Bridges  30x5s           Single leg bridges  20x 90/90           Clams  30x5s           Prone hip ext  20x 2/2/4           Prone glute sets KF  20x 2/2/4           Ecc hip flex EOT 3x5 3x6           Glute sets EOT  20x 2/2/4           Quadruped hip ext  2x10 foam                                     Ther Activity                                       Gait Training                                       Modalities             EPAT AZ

## 2022-07-14 ENCOUNTER — OFFICE VISIT (OUTPATIENT)
Dept: PHYSICAL THERAPY | Facility: MEDICAL CENTER | Age: 67
End: 2022-07-14
Payer: MEDICARE

## 2022-07-14 DIAGNOSIS — M76.11 PSOAS TENDINITIS OF RIGHT SIDE: Primary | ICD-10-CM

## 2022-07-14 PROCEDURE — 97110 THERAPEUTIC EXERCISES: CPT | Performed by: PHYSICAL THERAPIST

## 2022-07-14 PROCEDURE — 97112 NEUROMUSCULAR REEDUCATION: CPT | Performed by: PHYSICAL THERAPIST

## 2022-07-14 NOTE — PROGRESS NOTES
Daily Note     Today's date: 2022  Patient name: Rich Wilkerson  : 1955  MRN: [id number]  Referring provider: Yoli Swartz MD  Dx:   Encounter Diagnosis     ICD-10-CM    1  Psoas tendinitis of right side  M76 11                   Subjective:  Patient states that she feels good  Objective: See treatment diary below  Assessment:  Patient presents c/ mild posterior chain muscle soreness today  She is tolerating exercise progressions well  Tolerated treatment well  Patient would benefit from continued PT  Plan: Continue per plan of care  Precautions:  Hx R SEAMUS;  Hx Breast CA; Osteoporosis    Manuals           Iliopsoas release                                                    Neuro Re-Ed             TA isometrics 30x5s 30x5s 30x5s          Marches 2x10 3x10 3x10          90/90 march  3x6 yellow 3x8 yellow          Ther Ex             Hip flexor str 3x30s 3x30s 3x30s          Quad str 3x30s 3x30s 3x30s          Bridges  30x5s 30x5s          Single leg bridges  20x 90/90 20x 90/90          Clams  30x5s 30x5s          Prone hip ext  20x 2/2/4 30x 2/2/4           Prone glute sets KF  20x 2/2/4 20x 2/2/4          Ecc hip flex EOT 3x5 3x6 3x8          Glute sets EOT  20x 2/2/4 20x 2/2/4          Quadruped hip ext  2x10 foam 2x10 foam                                    Ther Activity                                       Gait Training                                       Modalities             EPAT AZ

## 2022-07-18 ENCOUNTER — OFFICE VISIT (OUTPATIENT)
Dept: PHYSICAL THERAPY | Facility: MEDICAL CENTER | Age: 67
End: 2022-07-18
Payer: MEDICARE

## 2022-07-18 DIAGNOSIS — M76.11 PSOAS TENDINITIS OF RIGHT SIDE: Primary | ICD-10-CM

## 2022-07-18 PROCEDURE — 97110 THERAPEUTIC EXERCISES: CPT | Performed by: PHYSICAL THERAPIST

## 2022-07-18 PROCEDURE — 97112 NEUROMUSCULAR REEDUCATION: CPT | Performed by: PHYSICAL THERAPIST

## 2022-07-18 NOTE — PROGRESS NOTES
Daily Note     Today's date: 2022  Patient name: Rich Wilkerson  : 1955  MRN: [id number]  Referring provider: Yoli Swartz MD  Dx:   Encounter Diagnosis     ICD-10-CM    1  Psoas tendinitis of right side  M76 11                   Subjective:  Patient states that she is doing well  Objective: See treatment diary below  Assessment:  Patient demonstrates good tolerance to exercise progressions c/ increased posterior chain control  She demonstrates pelvic tilting c/ R LE stability  Tolerated treatment well  Patient would benefit from continued PT  Plan: Continue per plan of care  Precautions:  Hx R SEAMUS;  Hx Breast CA; Osteoporosis    Manuals          Iliopsoas release                                                    Neuro Re-Ed             TA isometrics 30x5s 30x5s 30x5s 30x5s         Marches 2x10 3x10 3x10 3x12         90/90 march  3x6 yellow 3x8 yellow 3x12 yellow         Ther Ex             Hip flexor str 3x30s 3x30s 3x30s 3x30s         Quad str 3x30s 3x30s 3x30s 3x30s         Bridges  30x5s 30x5s 3x10 5s 10#         Single leg bridges  20x 90/90 20x 90/90 3x10 90/90         Clams  30x5s 30x5s 30x5s         Prone hip ext  20x 2/2/4 30x 2/2/4  30x 2/2/4         Prone glute sets KF  20x 2/2/4 20x 2/2/4 20x 2# 2/2/4         Ecc hip flex EOT 3x5 3x6 3x8 3x10         Glute sets EOT  20x 2/2/4 20x 2/2/4 30x 2/2/4         Quadruped hip ext  2x10 foam 2x10 foam 3x10 foam                                   Ther Activity                                       Gait Training                                       Modalities             EPAT AZ

## 2022-07-21 ENCOUNTER — APPOINTMENT (OUTPATIENT)
Dept: PHYSICAL THERAPY | Facility: MEDICAL CENTER | Age: 67
End: 2022-07-21
Payer: MEDICARE

## 2022-07-25 ENCOUNTER — OFFICE VISIT (OUTPATIENT)
Dept: PHYSICAL THERAPY | Facility: MEDICAL CENTER | Age: 67
End: 2022-07-25
Payer: MEDICARE

## 2022-07-25 DIAGNOSIS — M76.11 PSOAS TENDINITIS OF RIGHT SIDE: Primary | ICD-10-CM

## 2022-07-25 PROCEDURE — 97112 NEUROMUSCULAR REEDUCATION: CPT | Performed by: PHYSICAL THERAPIST

## 2022-07-25 PROCEDURE — 97110 THERAPEUTIC EXERCISES: CPT | Performed by: PHYSICAL THERAPIST

## 2022-07-25 NOTE — PROGRESS NOTES
Daily Note     Today's date: 2022  Patient name: Miladys Carvajal  : 1955  MRN: [id number]  Referring provider: Monserrat Edwards MD  Dx:   Encounter Diagnosis     ICD-10-CM    1  Psoas tendinitis of right side  M76 11                   Subjective:  Patient states that she is doing well  Objective: See treatment diary below  Assessment:  Patient demonstrates increase strength and pelvic control  She continues c/ discomfort and occasional snapping c/ hip flexion  Will continue to progress patient's activity tolerance as able  Tolerated treatment well  Patient would benefit from continued PT  Plan: Continue per plan of care  Precautions:  Hx R SEAMUS;  Hx Breast CA; Osteoporosis    Manuals         Iliopsoas release                                                    Neuro Re-Ed             TA isometrics 30x5s 30x5s 30x5s 30x5s 30x5s        Marches 2x10 3x10 3x10 3x12 3x14        90/90 march  3x6 yellow 3x8 yellow 3x12 yellow 3x14 red        Ther Ex             Bike     5'        SLRs     3x5        Hip flexor str 3x30s 3x30s 3x30s 3x30s 3x30s        Quad str 3x30s 3x30s 3x30s 3x30s 3x30s        Bridges  30x5s 30x5s 3x10 5s 10# 3x12 5s 10#        Single leg bridges  20x 90/90 20x 90/90 3x10 90/90 3x12 90/90        Clams  30x5s 30x5s 30x5s [id number]        Prone hip ext  20x 2/2/4 30x 2/2/4  30x 2/2/4 30x 2/2/4 2#        Prone glute sets KF  20x 2/2/4 20x 2/2/4 20x 2# 2/2/4 30x 2# 2/2/4        Ecc hip flex EOT 3x5 3x6 3x8 3x10 3x10        Glute sets EOT  20x 2/2/4 20x 2/2/4 30x 2/2/4 30x 2/2/4 2#        Quadruped hip ext  2x10 foam 2x10 foam 3x10 foam 3x10 foam                                  Ther Activity                                       Gait Training                                       Modalities             EPAT AZ

## 2022-07-28 ENCOUNTER — OFFICE VISIT (OUTPATIENT)
Dept: PHYSICAL THERAPY | Facility: MEDICAL CENTER | Age: 67
End: 2022-07-28
Payer: MEDICARE

## 2022-07-28 DIAGNOSIS — M76.11 PSOAS TENDINITIS OF RIGHT SIDE: Primary | ICD-10-CM

## 2022-07-28 PROCEDURE — 97110 THERAPEUTIC EXERCISES: CPT | Performed by: PHYSICAL THERAPIST

## 2022-07-28 PROCEDURE — 97112 NEUROMUSCULAR REEDUCATION: CPT | Performed by: PHYSICAL THERAPIST

## 2022-07-28 NOTE — PROGRESS NOTES
Daily Note     Today's date: 2022  Patient name: Bing Tilley  : 1955  MRN: [id number]  Referring provider: Sharona Root MD  Dx:   Encounter Diagnosis     ICD-10-CM    1  Psoas tendinitis of right side  M76 11                   Subjective:  Patient states that she is doing well  Objective: See treatment diary below  Assessment:  Patient presents c/ decreased right hip pain  She demonstrates hip adduction c/ hip flexion and single leg instability c/ steps on the right  Patient will continue to benefit from strengthening per POC  Plan: Continue per plan of care  Precautions:  Hx R SEAMUS;  Hx Breast CA; Osteoporosis    Manuals        Iliopsoas release                                                    Neuro Re-Ed             TA isometrics 30x5s 30x5s 30x5s 30x5s 30x5s 30x5s       Marches 2x10 3x10 3x10 3x12 3x14 3x16       90/90 march  3x6 yellow 3x8 yellow 3x12 yellow 3x14 red 3x16 red       Ther Ex             Bike     5' 7'       SLRs     3x5 3x6       Hip flexor str 3x30s 3x30s 3x30s 3x30s 3x30s 3x30s       Quad str 3x30s 3x30s 3x30s 3x30s 3x30s 3x30s       Bridges  30x5s 30x5s 3x10 5s 10# 3x12 5s 10# 3x15 5s 10#       Single leg bridges  20x 90/90 20x 90/90 3x10 90/90 3x12 90/90 3x15 90/90       Clams  30x5s 30x5s 30x5s 30x5s red 30x5s green       Prone hip ext  20x 2/2/4 30x 2/2/4  30x 2/2/4 30x 2/2/4 2# 30x 2/2/4 2#       Prone glute sets KF  20x 2/2/4 20x 2/2/4 20x 2# 2/2/4 30x 2# 2/2/4 30x 2/2/4 2#       Ecc hip flex EOT 3x5 3x6 3x8 3x10 3x10 3x10       Glute sets EOT  20x 2/2/4 20x 2/2/4 30x 2/2/4 30x 2/2/4 2# 30x 2/2/4 2#       Quadruped hip ext  2x10 foam 2x10 foam 3x10 foam 3x10 foam home       Steps      3x5 c/ hip flex                    Ther Activity                                       Gait Training                                       Modalities             EPAT [place]

## 2022-08-01 ENCOUNTER — OFFICE VISIT (OUTPATIENT)
Dept: PHYSICAL THERAPY | Facility: MEDICAL CENTER | Age: 67
End: 2022-08-01
Payer: MEDICARE

## 2022-08-01 DIAGNOSIS — M76.11 PSOAS TENDINITIS OF RIGHT SIDE: Primary | ICD-10-CM

## 2022-08-01 PROCEDURE — 97112 NEUROMUSCULAR REEDUCATION: CPT | Performed by: PHYSICAL THERAPIST

## 2022-08-01 PROCEDURE — 97110 THERAPEUTIC EXERCISES: CPT | Performed by: PHYSICAL THERAPIST

## 2022-08-01 NOTE — PROGRESS NOTES
Daily Note     Today's date: 2022  Patient name: Sparkle Mathur  : 1955  MRN: [id number]  Referring provider: Crhis Johnson MD  Dx:   Encounter Diagnosis     ICD-10-CM    1  Psoas tendinitis of right side  M76 11                   Subjective:  Patient states that she is doing well  Objective: See treatment diary below  Assessment:  Patient presents without R hip pain  She is tolerating exercise progressions well c/ verbal cues for form  Patient demonstrates R knee valgus c/ step ups secondary to hip weakness  Tolerated treatment well  Patient would benefit from continued PT  Plan: Continue per plan of care  Precautions:  Hx R SEAMUS;  Hx Breast CA; Osteoporosis    Manuals       Iliopsoas release                                                    Neuro Re-Ed             TA isometrics 30x5s 30x5s 30x5s 30x5s 30x5s 30x5s 30x5s      Marches 2x10 3x10 3x10 3x12 3x14 3x16 3x20      90/90 march  3x6 yellow 3x8 yellow 3x12 yellow 3x14 red 3x16 red 3x20 green      Ther Ex             Bike     5' 7' 10'      SLRs     3x5 3x6 3x8      Hip flexor str 3x30s 3x30s 3x30s 3x30s 3x30s 3x30s 3x30s      Quad str 3x30s 3x30s 3x30s 3x30s 3x30s 3x30s 3x30s      Bridges  30x5s 30x5s 3x10 5s 10# 3x12 5s 10# 3x15 5s 10# [id number]      Single leg bridges  20x 90/90 20x 90/90 3x10 90/90 3x12 90/90 3x15 90/90 [id number] ext      Clams  30x5s 30x5s 30x5s 30x5s red 30x5s green [id number]      Prone hip ext  20x 2/2/4 30x 2/2/4  30x 2/2/4 30x 2/2/4 2# 30x 2/2/4 2# 20x 2/2/4 3#      Prone glute sets KF  20x 2/2/4 20x 2/2/4 20x 2# 2/2/4 30x 2# 2/2/4 30x 2/2/4 2# 20x 2/2/4 3#      Ecc hip flex EOT 3x5 3x6 3x8 3x10 3x10 3x10       Glute sets EOT  20x 2/2/4 20x 2/2/4 30x 2/2/4 30x 2//4 2# 30x 2// 2# 20x 2/ 3#      Quadruped hip ext  2x10 foam 2x10 foam 3x10 foam 3x10 foam home       Steps      3x5 c/ hip flex 20x 8" c/ hip flex      SL balance       3x       Ther Activity Gait Training                                       Modalities             EPAT [place]

## 2022-08-04 ENCOUNTER — OFFICE VISIT (OUTPATIENT)
Dept: PHYSICAL THERAPY | Facility: MEDICAL CENTER | Age: 67
End: 2022-08-04
Payer: MEDICARE

## 2022-08-04 DIAGNOSIS — M76.11 PSOAS TENDINITIS OF RIGHT SIDE: Primary | ICD-10-CM

## 2022-08-04 PROCEDURE — 97112 NEUROMUSCULAR REEDUCATION: CPT | Performed by: PHYSICAL THERAPIST

## 2022-08-04 PROCEDURE — 97110 THERAPEUTIC EXERCISES: CPT | Performed by: PHYSICAL THERAPIST

## 2022-08-04 NOTE — PROGRESS NOTES
Daily Note     Today's date: 2022  Patient name: Liza Crespo  : 1955  MRN: [id number]  Referring provider: Gurpreet Cadet MD  Dx:   Encounter Diagnosis     ICD-10-CM    1  Psoas tendinitis of right side  M76 11                   Subjective:  Patient states that she is doing well  Objective: See treatment diary below  Assessment:  Patient presents c/ decreased R hip pain  She is doing well c/ exercise progressions  Tolerated treatment well  Patient would benefit from continued PT  Plan: Continue per plan of care  Precautions:  Hx R SEAMUS;  Hx Breast CA; Osteoporosis    Manuals      Iliopsoas release                                                    Neuro Re-Ed             TA isometrics 30x5s 30x5s 30x5s 30x5s 30x5s 30x5s 30x5s 30x5s     Marches 2x10 3x10 3x10 3x12 3x14 3x16 3x20 3x20 luis miguel 10#     90/90 march  3x6 yellow 3x8 yellow 3x12 yellow 3x14 red 3x16 red 3x20 green 3x10 luis miguel 10#     Ther Ex             Bike     5' 7' 10' 10'     SLRs     3x5 3x6 3x8      Hip flexor str 3x30s 3x30s 3x30s 3x30s 3x30s 3x30s 3x30s 10x 10s step     Quad str 3x30s 3x30s 3x30s 3x30s 3x30s 3x30s 3x30s      Bridges  30x5s 30x5s 3x10 5s 10# 3x12 5s 10# 3x15 5s 10# [id number]      Single leg bridges  20x 90/90 20x 90/90 3x10 90/90 3x12 90/90 3x15 90/90 [id number] ext      Clams  30x5s 30x5s 30x5s 30x5s red 30x5s green [id number]      Prone hip ext  20x 2/2/4 30x 2/2/4  30x 2/2/4 30x 2/2/4 2# 30x 2/2/4 2# 20x 2/2/4 3#      Prone glute sets KF  20x 2/2/4 20x 2/2/4 20x 2# 2/2/4 30x 2# 2/2/4 30x 2/2/4 2# 20x 2/2/4 3#      Ecc hip flex EOT 3x5 3x6 3x8 3x10 3x10 3x10       Glute sets EOT  20x 2/2/4 20x 2/2/4 30x 2/2/4 30x 2/2/4 2# 30x 2/2/4 2# 20x 2/2/4 3#      Quadruped hip ext  2x10 foam 2x10 foam 3x10 foam 3x10 foam home       Steps      3x5 c/ hip flex 20x 8" c/ hip flex 3x10 8" c/ hip flex     SL balance       3x  alph 3x     Squats        3x10 green     Banded side steps 3x10' green ankles     Ther Activity                                       Gait Training                                       Modalities             EPAT [place]

## 2025-04-21 NOTE — PROGRESS NOTES
Daily Note     Today's date: 2022  Patient name: Aga Larsen  : 1955  MRN: [id number]  Referring provider: Biju Pettit MD  Dx:   Encounter Diagnosis     ICD-10-CM    1  Psoas tendinitis of right side  M76 11                   Subjective:  Patient states that she felt a little better after last session  Objective: See treatment diary below  Assessment:  Patient presents c/ minimized R hip pain  She tolerated EPAT well  Patient education on avoiding aggravating activities at this time  Tolerated treatment well  Patient would benefit from continued PT  Plan: Continue per plan of care  Precautions:  Hx R SEAMUS;  Hx Breast CA; Osteoporosis    Manuals 5/10 5/17 5/20          Iliopsoas release AZ AZ AZ          STM/MFR  AZ AZ                                    Neuro Re-Ed             TA isometrics 20x5s 30x5s 30x5s                                    Ther Ex             Prone glute squeezes 30x5s 30x5s 30x5s          Prone heel pushes 30x5s 30x5s 30x5s          Prone glute sets KF 20x5s 20x5s 20x5s          Hip add  20x5s 30x5s          Bridges  2x10 c/ ball 3x10 c/ ball          Clams  20x5s 30x5s          Reverse clams  20x5s 30x5s                       SL hip abd  20x5s 20x5s          1/2 kneel hip mobility 2 way  10x ea 10x ea                       Ther Activity                                       Gait Training                                       Modalities             EPAT (iliopsoas insertion)   AZ Iron& TIBC ferritain, b12. -- patient has pre op bw completed -- showing some abnormal results, would like to test for anemia    Rx Refill Request     Name: Emelia Aponte  :  1979     Date of last appointment:  2024   Date of next appointment:  2025   Best number to reach patient:  [phone]